# Patient Record
Sex: FEMALE | Race: WHITE | NOT HISPANIC OR LATINO | Employment: UNEMPLOYED | URBAN - NONMETROPOLITAN AREA
[De-identification: names, ages, dates, MRNs, and addresses within clinical notes are randomized per-mention and may not be internally consistent; named-entity substitution may affect disease eponyms.]

---

## 2019-08-13 ENCOUNTER — OFFICE VISIT (OUTPATIENT)
Dept: FAMILY MEDICINE CLINIC | Facility: CLINIC | Age: 29
End: 2019-08-13

## 2019-08-13 VITALS
HEART RATE: 62 BPM | WEIGHT: 146 LBS | DIASTOLIC BLOOD PRESSURE: 64 MMHG | BODY MASS INDEX: 23.46 KG/M2 | OXYGEN SATURATION: 99 % | SYSTOLIC BLOOD PRESSURE: 106 MMHG | HEIGHT: 66 IN

## 2019-08-13 DIAGNOSIS — F19.11 HISTORY OF DRUG ABUSE IN REMISSION (HCC): ICD-10-CM

## 2019-08-13 DIAGNOSIS — R06.02 SHORTNESS OF BREATH: ICD-10-CM

## 2019-08-13 DIAGNOSIS — R05.3 CHRONIC COUGH: Primary | ICD-10-CM

## 2019-08-13 DIAGNOSIS — B18.2 CHRONIC HEPATITIS C WITHOUT HEPATIC COMA (HCC): ICD-10-CM

## 2019-08-13 DIAGNOSIS — Z72.0 TOBACCO ABUSE: ICD-10-CM

## 2019-08-13 DIAGNOSIS — J30.9 ALLERGIC RHINITIS, UNSPECIFIED SEASONALITY, UNSPECIFIED TRIGGER: ICD-10-CM

## 2019-08-13 PROBLEM — J44.9 COPD (CHRONIC OBSTRUCTIVE PULMONARY DISEASE) (HCC): Status: ACTIVE | Noted: 2019-08-13

## 2019-08-13 PROCEDURE — 99406 BEHAV CHNG SMOKING 3-10 MIN: CPT | Performed by: NURSE PRACTITIONER

## 2019-08-13 PROCEDURE — 99214 OFFICE O/P EST MOD 30 MIN: CPT | Performed by: NURSE PRACTITIONER

## 2019-08-13 RX ORDER — NICOTINE 21 MG/24HR
1 PATCH, TRANSDERMAL 24 HOURS TRANSDERMAL EVERY 24 HOURS
Qty: 28 PATCH | Refills: 0 | Status: SHIPPED | OUTPATIENT
Start: 2019-08-13 | End: 2019-08-28

## 2019-08-13 RX ORDER — LORATADINE 10 MG/1
10 TABLET ORAL DAILY
Qty: 30 TABLET | Refills: 5 | Status: SHIPPED | OUTPATIENT
Start: 2019-08-13 | End: 2019-08-28 | Stop reason: SDUPTHER

## 2019-08-13 NOTE — PROGRESS NOTES
New Patient History and Physical      Referring Physician: No ref. provider found    Subjective     Chief Complaint:    Chief Complaint   Patient presents with   • Establish Care   • Cough     Patient states that she has had symptoms for approximately six months and treatment has not helped.    • Sinus Problem   • Ear Drainage   • Nicotine Dependence       History of Present Illness:   1 year of cough, chest congestion with mucous that is brown, clear or green. Feels like she has has nose, ear, and eye drainage. + Itchiness. Takes zyrtec prn. No nose spray. Feels soa when laying flat in bed, or when she anxious. No fever or chills. Over the past 6 months she has been put on amoxicillin, zpak and rocephin injection. Uses albuterol prn. Has hx of mild asthma when she was little. Has smoked 14 years ago. Has tried chantix, wellbutrin without success.      She does have history drug abuse.  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  She was on Suboxone.  No longer taking Suboxone.  Has history of hep C.  Was seen at the hep C clinic at .  Was not a treatment candidate due to insurance coverage.  Had her labs drawn a year ago with normal LFTs.  She has had hepatitis A vaccines.  She will check with [facility] on the status of her hepatitis B vaccines.    She is due for a Pap    Review of Systems   Constitutional: Negative for unexpected weight gain and unexpected weight loss.   Cardiovascular: Negative for chest pain and palpitations.   Gastrointestinal: Negative for abdominal pain, constipation, diarrhea and vomiting.   Endocrine: Negative for polydipsia and polyuria.   Genitourinary: Negative for dysuria and frequency.   Skin: Negative for rash.   Neurological: Negative for dizziness and light-headedness.   Hematological: Does not bruise/bleed easily.   Psychiatric/Behavioral: The patient is nervous/anxious.         Past Medical History:   Past Medical History:   Diagnosis Date   •  "Anxiety    • Depression    • GERD (gastroesophageal reflux disease)        Past Surgical History:  Past Surgical History:   Procedure Laterality Date   •  SECTION  , ,    • TONSILLECTOMY  age 13   • TUBAL ABDOMINAL LIGATION         Family History: family history includes Arthritis in her maternal grandfather, maternal grandmother, paternal grandfather, and paternal grandmother; Diabetes in her father; Heart attack in her mother; Mental illness in her mother; Thyroid disease in her mother.    Social History:  reports that she has been smoking cigarettes.  She has been smoking about 1.00 pack per day. She has never used smokeless tobacco. She reports that she does not drink alcohol or use drugs.    Medications:    Current Outpatient Medications:   •  loratadine (CLARITIN) 10 MG tablet, Take 1 tablet by mouth Daily., Disp: 30 tablet, Rfl: 5  •  nicotine (NICODERM CQ) 21 MG/24HR patch, Place 1 patch on the skin as directed by provider Daily., Disp: 28 patch, Rfl: 0    Allergies:  No Known Allergies    Objective     Vital Signs:   Vitals:    19 1502   BP: 106/64   Pulse: 62   SpO2: 99%   Weight: 66.2 kg (146 lb)   Height: 167.6 cm (66\")       Physical Exam:    Physical Exam   Constitutional: She is oriented to person, place, and time. She appears well-developed and well-nourished.   HENT:   Head: Normocephalic and atraumatic.   Right Ear: Tympanic membrane, external ear and ear canal normal.   Left Ear: Tympanic membrane, external ear and ear canal normal.   Nose: Mucosal edema and abnormal turbinate present.   Mouth/Throat: Uvula is midline, oropharynx is clear and moist and mucous membranes are normal.   Eyes: Conjunctivae, EOM and lids are normal. Pupils are equal, round, and reactive to light. No scleral icterus.   Fundoscopic exam:       The right eye shows red reflex.        The left eye shows red reflex.   Neck: Neck supple. Carotid bruit is not present. No tracheal deviation present. " No thyromegaly present.   Cardiovascular: Normal rate, regular rhythm, normal heart sounds and intact distal pulses. Exam reveals no gallop and no friction rub.   No murmur heard.  Pulmonary/Chest: Effort normal and breath sounds normal.   Abdominal: Soft. Bowel sounds are normal. She exhibits no distension. There is no hepatosplenomegaly. There is no tenderness.   Musculoskeletal: She exhibits no edema.   Lymphadenopathy:     She has no cervical adenopathy.   Neurological: She is alert and oriented to person, place, and time. No cranial nerve deficit or sensory deficit. She displays a negative Romberg sign.   Skin: Skin is warm and dry. No rash noted.   Psychiatric: She has a normal mood and affect. Her behavior is normal.   Nursing note and vitals reviewed.      Assessment / Plan     Assessment/Plan:   Problem List Items Addressed This Visit        Respiratory    Chronic cough - Primary    Relevant Orders    Full Pulmonary Function Test With Bronchodilator    Shortness of breath    Relevant Orders    Full Pulmonary Function Test With Bronchodilator    Allergic rhinitis    Relevant Medications    loratadine (CLARITIN) 10 MG tablet       Digestive    Chronic hepatitis C without hepatic coma (CMS/HCC)       Other    Tobacco abuse    Relevant Medications    nicotine (NICODERM CQ) 21 MG/24HR patch    History of drug abuse in remission          28-year-old with history of drug abuse, hepatitis C, tobacco abuse who presents to establish care    Chronic cough  Shortness of breath  -Discussed concern for COPD/asthma given symptoms  -PFTs ordered  -Continue albuterol  -Will treat further based on PFT results    Allergic rhinitis  -Daily loratadine    Hepatitis C  -Obtain records from [facility] for hepatitis vaccination status, needs routine liver enzyme checks    Tobacco abuse  -6 minutes was spent on tobacco cessation.  She will try nicotine patches.  May use nicotine gum/lozenge as needed with patch.  - Personal  "\"Quitting Coaches\" plus resources for free quitting aides and medications are available through the FREE Quit Now [place] program. Call 6-764-QUIT-NOW or visit www.quitnowkenRallyhood.org    Health maintenance  -Encouraged to schedule Pap smear  -Obtain vaccine records from [facility]    Follow up:  Return in about 3 weeks (around 9/3/2019).      Electronically signed by KOFI Bejarano   08/13/2019 3:09 PM      Please note that portions of this note may have been completed with a voice recognition program. Efforts were made to edit the dictations, but occasionally words are mistranscribed.  "

## 2019-08-23 ENCOUNTER — HOSPITAL ENCOUNTER (OUTPATIENT)
Dept: PULMONOLOGY | Facility: HOSPITAL | Age: 29
Discharge: HOME | End: 2019-08-23
Admitting: NURSE PRACTITIONER

## 2019-08-23 DIAGNOSIS — R05.3 CHRONIC COUGH: ICD-10-CM

## 2019-08-23 DIAGNOSIS — R06.02 SHORTNESS OF BREATH: ICD-10-CM

## 2019-08-23 PROCEDURE — 94729 DIFFUSING CAPACITY: CPT | Performed by: INTERNAL MEDICINE

## 2019-08-23 PROCEDURE — 94060 EVALUATION OF WHEEZING: CPT

## 2019-08-23 PROCEDURE — 94640 AIRWAY INHALATION TREATMENT: CPT

## 2019-08-23 PROCEDURE — 94060 EVALUATION OF WHEEZING: CPT | Performed by: INTERNAL MEDICINE

## 2019-08-23 PROCEDURE — 94726 PLETHYSMOGRAPHY LUNG VOLUMES: CPT | Performed by: INTERNAL MEDICINE

## 2019-08-23 PROCEDURE — 94726 PLETHYSMOGRAPHY LUNG VOLUMES: CPT

## 2019-08-23 PROCEDURE — 94729 DIFFUSING CAPACITY: CPT

## 2019-08-23 RX ORDER — ALBUTEROL SULFATE 2.5 MG/3ML
2.5 SOLUTION RESPIRATORY (INHALATION) ONCE
Status: COMPLETED | OUTPATIENT
Start: 2019-08-23 | End: 2019-08-23

## 2019-08-23 RX ADMIN — ALBUTEROL SULFATE 2.5 MG: 2.5 SOLUTION RESPIRATORY (INHALATION) at 09:03

## 2019-08-28 ENCOUNTER — OFFICE VISIT (OUTPATIENT)
Dept: FAMILY MEDICINE CLINIC | Facility: CLINIC | Age: 29
End: 2019-08-28

## 2019-08-28 VITALS
OXYGEN SATURATION: 99 % | HEIGHT: 66 IN | SYSTOLIC BLOOD PRESSURE: 100 MMHG | BODY MASS INDEX: 23.3 KG/M2 | DIASTOLIC BLOOD PRESSURE: 60 MMHG | HEART RATE: 74 BPM | TEMPERATURE: 98.6 F | RESPIRATION RATE: 14 BRPM | WEIGHT: 145 LBS

## 2019-08-28 DIAGNOSIS — F41.9 ANXIETY AND DEPRESSION: ICD-10-CM

## 2019-08-28 DIAGNOSIS — F32.A ANXIETY AND DEPRESSION: ICD-10-CM

## 2019-08-28 DIAGNOSIS — J30.9 ALLERGIC RHINITIS, UNSPECIFIED SEASONALITY, UNSPECIFIED TRIGGER: Primary | ICD-10-CM

## 2019-08-28 PROCEDURE — 99214 OFFICE O/P EST MOD 30 MIN: CPT | Performed by: NURSE PRACTITIONER

## 2019-08-28 RX ORDER — MONTELUKAST SODIUM 10 MG/1
10 TABLET ORAL NIGHTLY
Qty: 30 TABLET | Refills: 5 | Status: SHIPPED | OUTPATIENT
Start: 2019-08-28

## 2019-08-28 RX ORDER — CETIRIZINE HYDROCHLORIDE 10 MG/1
10 TABLET ORAL DAILY
COMMUNITY

## 2019-08-28 RX ORDER — FLUOXETINE 10 MG/1
CAPSULE ORAL
Qty: 50 CAPSULE | Refills: 0 | Status: SHIPPED | OUTPATIENT
Start: 2019-08-28 | End: 2019-10-11 | Stop reason: SDUPTHER

## 2019-08-28 RX ORDER — IBUPROFEN 400 MG/1
TABLET ORAL
COMMUNITY
Start: 2019-08-14

## 2019-08-28 NOTE — PROGRESS NOTES
Subjective     Chief Complaint:    Chief Complaint   Patient presents with   • Follow-up     3 week follow up on cough and PFT, started taking Zyrtec along with Claritin and cough seems to be a little better       History of Present Illness:   Did not tolerate nicotine patches. Made her burn up at night. Has started taking zyrtec with her claritin. She has cats and dogs at home and things that makes her symptoms worse. Worse when she lays down at night. Uses ventolin once a day. Feels like her anxiety is making her symptoms worse. She is having daily panic attacks. Worse in social situations. Has taken paxil in the past. Had to stop due to transporation. Has depression. Is starting a new job soon and feels like she needs some medication. No thoughts of grandiosity. No SI/HI. Has been fired from several jobs due to panic attacks.     Review of Systems   Constitutional: Negative for chills and fever.   Respiratory: Positive for cough and shortness of breath.    Cardiovascular: Negative for chest pain and palpitations.   Gastrointestinal: Negative for abdominal pain and nausea.   Neurological: Negative for dizziness and headache.   Psychiatric/Behavioral: Positive for depressed mood. The patient is nervous/anxious.         I have reviewed and/or updated the patient's past medical, surgical, family, social history and problem list as appropriate.   Medications:    Current Outpatient Medications:   •  cetirizine (zyrTEC) 10 MG tablet, Take 10 mg by mouth Daily., Disp: , Rfl:   •  ibuprofen (ADVIL,MOTRIN) 400 MG tablet, , Disp: , Rfl:   •  FLUoxetine (PROZAC) 10 MG capsule, Take 10mg in the morning for 1-2 weeks, then take 20mg once daily thereafter, Disp: 50 capsule, Rfl: 0  •  montelukast (SINGULAIR) 10 MG tablet, Take 1 tablet by mouth Every Night., Disp: 30 tablet, Rfl: 5    Allergies:  No Known Allergies    Objective     Vital Signs:   Vitals:    08/28/19 0802   BP: 100/60   Pulse: 74   Resp: 14   Temp: 98.6 °F  "(37 °C)   SpO2: 99%   Weight: 65.8 kg (145 lb)   Height: 167.6 cm (66\")       Physical Exam:    Physical Exam   Constitutional: She is oriented to person, place, and time. She appears well-developed and well-nourished.   HENT:   Head: Normocephalic and atraumatic.   Cardiovascular: Normal rate and regular rhythm.   Pulmonary/Chest: Effort normal and breath sounds normal.   Abdominal: Soft. Bowel sounds are normal. She exhibits no distension.   Neurological: She is alert and oriented to person, place, and time.   Skin: Skin is warm and dry.   Psychiatric:   Tearful    Nursing note and vitals reviewed.      Assessment / Plan     Assessment/Plan:   Problem List Items Addressed This Visit        Respiratory    Allergic rhinitis - Primary    Relevant Medications    montelukast (SINGULAIR) 10 MG tablet       Other    Anxiety and depression    Relevant Medications    FLUoxetine (PROZAC) 10 MG capsule          Allergic Rhinitis  SOA  Tobacco abuse  - stop claritin, continue zyrtec, start singulair  - PFT reviewed and relatively normal  - I think anxiety is driving a lot of her SOA  - failed nicotine patches, encouraged cessation    Anxiety and Depression  - prozac,  Medication discussed, advised to go to ED for any SI/HI, encouraged counseling/CBT, encouraged clean eating and exercise      Follow up:  Return in about 1 month (around 9/28/2019) for pap, depression.    Electronically signed by KOFI Bejarano   08/28/2019 8:15 AM      Please note that portions of this note may have been completed with a voice recognition program. Efforts were made to edit the dictations, but occasionally words are mistranscribed.  "

## 2019-10-11 ENCOUNTER — OFFICE VISIT (OUTPATIENT)
Dept: FAMILY MEDICINE CLINIC | Facility: CLINIC | Age: 29
End: 2019-10-11

## 2019-10-11 VITALS
TEMPERATURE: 98.4 F | HEIGHT: 66 IN | OXYGEN SATURATION: 98 % | BODY MASS INDEX: 21.74 KG/M2 | SYSTOLIC BLOOD PRESSURE: 115 MMHG | DIASTOLIC BLOOD PRESSURE: 70 MMHG | WEIGHT: 135.25 LBS | HEART RATE: 66 BPM

## 2019-10-11 DIAGNOSIS — N92.6 IRREGULAR MENSES: ICD-10-CM

## 2019-10-11 DIAGNOSIS — K21.9 GASTROESOPHAGEAL REFLUX DISEASE WITHOUT ESOPHAGITIS: ICD-10-CM

## 2019-10-11 DIAGNOSIS — F41.9 ANXIETY AND DEPRESSION: ICD-10-CM

## 2019-10-11 DIAGNOSIS — Z01.419 ENCOUNTER FOR WELL WOMAN EXAM WITH ROUTINE GYNECOLOGICAL EXAM: Primary | ICD-10-CM

## 2019-10-11 DIAGNOSIS — F32.A ANXIETY AND DEPRESSION: ICD-10-CM

## 2019-10-11 DIAGNOSIS — N63.0 BREAST NODULE: ICD-10-CM

## 2019-10-11 PROCEDURE — 99395 PREV VISIT EST AGE 18-39: CPT | Performed by: NURSE PRACTITIONER

## 2019-10-11 RX ORDER — BUPRENORPHINE HYDROCHLORIDE AND NALOXONE HYDROCHLORIDE DIHYDRATE 8; 2 MG/1; MG/1
TABLET SUBLINGUAL
COMMUNITY
Start: 2019-10-07

## 2019-10-11 RX ORDER — FLUOXETINE HYDROCHLORIDE 20 MG/1
CAPSULE ORAL
Qty: 30 CAPSULE | Refills: 5 | Status: SHIPPED | OUTPATIENT
Start: 2019-10-11 | End: 2020-01-10

## 2019-10-11 RX ORDER — OMEPRAZOLE 40 MG/1
40 CAPSULE, DELAYED RELEASE ORAL DAILY
Qty: 30 CAPSULE | Refills: 5 | Status: SHIPPED | OUTPATIENT
Start: 2019-10-11 | End: 2020-01-10

## 2019-10-11 RX ORDER — AMOXICILLIN AND CLAVULANATE POTASSIUM 875; 125 MG/1; MG/1
TABLET, FILM COATED ORAL
COMMUNITY
Start: 2019-10-08 | End: 2019-10-11

## 2019-10-11 RX ORDER — OMEPRAZOLE 40 MG/1
CAPSULE, DELAYED RELEASE ORAL
COMMUNITY
Start: 2019-09-23 | End: 2019-10-11 | Stop reason: SDUPTHER

## 2019-10-11 NOTE — PROGRESS NOTES
Annual Well Woman Exam     Referring Physician: No ref. provider found    Subjective     Chief Complaint:    Chief Complaint   Patient presents with   • Gynecologic Exam     Annual physical with PAP   • Med Refill     Needs refill Prozac and would like to discuss getting omeprazole.       History of Present Illness:    Iram Grace is a 29 y.o. female who presents for annual exam, follow-up on depression and anxiety and reflux.  Reports doing well on the Prozac over the past couple weeks.  Still having some anxiety but not as much.  Panic attacks have decreased.  Overall mood has improved.  She does note about over a month ago she did almost relapse.  Due to this she went and is started Suboxone.  She is planning to take Suboxone for 6 months.  She reports almost daily reflux up into her chest regardless of what she eats.  Suboxone doctor put her on Prilosec which helped well.  She would like to have a refill.  Still smoking about half a pack a day.  Breathing is better.    Here for Pap.  Positive family history for breast cancer.  Does note left breast lump.  Not painful.  Increases in size when she misses a period.  Reports drinking lots of caffeine.  Skipped her period 2 months ago then normal.  This month.  Has done this off and on for a few years.  Has her tubes tied.     Review of Systems   Constitutional: Negative for fatigue and fever.   HENT: Negative for congestion and postnasal drip.    Respiratory: Negative for cough and shortness of breath.    Cardiovascular: Negative for chest pain and palpitations.   Genitourinary: Negative for vaginal discharge and vaginal pain.   Psychiatric/Behavioral: Negative for sleep disturbance. The patient is nervous/anxious.        Obstetric History:  OB History     No data available         Menstrual History:     No LMP recorded.       Sexual History:         Current contraception: tubal ligation  History of abnormal Pap smear: no    Perform regular self  "breast exam: yes - monthly  Family history of uterine or ovarian cancer: no  Family History of cervical cancer: no  Family History of colon cancer/colon polyps: no  History of abnormal mammogram: no  Family history of breast cancer: yes - grandmother  History of abnormal lipids: no    The following portions of the patient's history were reviewed and updated as appropriate: allergies, current medications, past family history, past medical history, past social history, past surgical history and problem list.    Objective     Vital Signs:   Vitals:    10/11/19 1009   BP: 115/70   Pulse: 66   Temp: 98.4 °F (36.9 °C)   SpO2: 98%   Weight: 61.3 kg (135 lb 4 oz)   Height: 167.6 cm (65.98\")       Physical Exam:    Physical Exam   Constitutional: She is oriented to person, place, and time. She appears well-developed and well-nourished.   HENT:   Head: Normocephalic and atraumatic.   Eyes: Pupils are equal, round, and reactive to light.   Neck: Neck supple.   Cardiovascular: Normal rate and regular rhythm.   Pulmonary/Chest: Effort normal and breath sounds normal.   Mobile firm marble size lump noted in left breast at 3 o'clock position.        Abdominal: Soft. Bowel sounds are normal. She exhibits no distension.   Genitourinary: Vagina normal and uterus normal. There is no rash or lesion on the right labia. There is no rash or lesion on the left labia. Uterus is retroverted. Cervix exhibits friability and cyanosis. Right adnexum displays no mass and no tenderness. Left adnexum displays no mass and no tenderness.   Neurological: She is alert and oriented to person, place, and time.   Skin: Skin is warm and dry.   Psychiatric: She has a normal mood and affect. Her behavior is normal.   Nursing note and vitals reviewed.      Assessment / Plan     Assessment/Plan:   Problem List Items Addressed This Visit        Digestive    Gastroesophageal reflux disease without esophagitis    Relevant Medications    omeprazole (priLOSEC) 40 " MG capsule       Other    Anxiety and depression    Relevant Medications    FLUoxetine (PROzac) 20 MG capsule      Other Visit Diagnoses     Encounter for well woman exam with routine gynecological exam    -  Primary    Relevant Orders    Liquid-based Pap Smear, Screening    Irregular menses        Relevant Orders    Comprehensive Metabolic Panel    CBC Auto Differential    TSH    Breast nodule        Relevant Orders    US breast left complete        --Pap performed  --Continue Prozac. - Medication discussed, advised to go to ED for any SI/HI, encouraged counseling/CBT, encouraged clean eating and exercise  --Refilled Prilosec. - Avoid eating spicy foods, Avoid caffeinated beverages, Avoid carbonated beverage, Do not eat or drink within 2-3 hours of going to bed in the evening, Elevate head of bed on 4-6 inch blocks, Eat smaller portions of food throughout the day  --Check labs due to irregular menses  -- discussed breast nodule is likely fibrocystic change, however due to family hx will obtain ultrasound. Encourage to decrease caffeine intake    Discussion Summary:  Labs as noted  Recommend avoidance of processed foods  Exercise for 150 minutes per week as tolerated  Recommend annual eye doctor appointment, or as necessary  See your dentist twice a year.  Recommend that you brush teeth twice daily and floss minimum of once daily  Recommend regular seatbelt use  Will contact with Pap results when available  Recommend monthly self-breast exam  I have reviewed pertinent health maintenance applicable to this patient.    Follow up:  In 3 months for depression and anxiety    Electronically signed by KOFI Bejarano   10/11/2019 10:58 AM

## 2019-10-12 LAB
ALBUMIN SERPL-MCNC: 4.6 G/DL (ref 3.5–5.2)
ALBUMIN/GLOB SERPL: 2 G/DL
ALP SERPL-CCNC: 40 U/L (ref 39–117)
ALT SERPL-CCNC: 29 U/L (ref 1–33)
AST SERPL-CCNC: 21 U/L (ref 1–32)
BASOPHILS # BLD AUTO: 0.02 10*3/MM3 (ref 0–0.2)
BASOPHILS NFR BLD AUTO: 0.3 % (ref 0–1.5)
BILIRUB SERPL-MCNC: 0.3 MG/DL (ref 0.2–1.2)
BUN SERPL-MCNC: 10 MG/DL (ref 6–20)
BUN/CREAT SERPL: 11.2 (ref 7–25)
CALCIUM SERPL-MCNC: 9.7 MG/DL (ref 8.6–10.5)
CHLORIDE SERPL-SCNC: 104 MMOL/L (ref 98–107)
CO2 SERPL-SCNC: 28.5 MMOL/L (ref 22–29)
CREAT SERPL-MCNC: 0.89 MG/DL (ref 0.57–1)
EOSINOPHIL # BLD AUTO: 0.12 10*3/MM3 (ref 0–0.4)
EOSINOPHIL NFR BLD AUTO: 2 % (ref 0.3–6.2)
ERYTHROCYTE [DISTWIDTH] IN BLOOD BY AUTOMATED COUNT: 11.7 % (ref 12.3–15.4)
GLOBULIN SER CALC-MCNC: 2.3 GM/DL
GLUCOSE SERPL-MCNC: 85 MG/DL (ref 65–99)
HCT VFR BLD AUTO: 40.8 % (ref 34–46.6)
HGB BLD-MCNC: 13.9 G/DL (ref 12–15.9)
IMM GRANULOCYTES # BLD AUTO: 0.01 10*3/MM3 (ref 0–0.05)
IMM GRANULOCYTES NFR BLD AUTO: 0.2 % (ref 0–0.5)
LYMPHOCYTES # BLD AUTO: 2 10*3/MM3 (ref 0.7–3.1)
LYMPHOCYTES NFR BLD AUTO: 33.2 % (ref 19.6–45.3)
MCH RBC QN AUTO: 31.4 PG (ref 26.6–33)
MCHC RBC AUTO-ENTMCNC: 34.1 G/DL (ref 31.5–35.7)
MCV RBC AUTO: 92.1 FL (ref 79–97)
MONOCYTES # BLD AUTO: 0.35 10*3/MM3 (ref 0.1–0.9)
MONOCYTES NFR BLD AUTO: 5.8 % (ref 5–12)
NEUTROPHILS # BLD AUTO: 3.53 10*3/MM3 (ref 1.7–7)
NEUTROPHILS NFR BLD AUTO: 58.5 % (ref 42.7–76)
NRBC BLD AUTO-RTO: 0.2 /100 WBC (ref 0–0.2)
PLATELET # BLD AUTO: 242 10*3/MM3 (ref 140–450)
POTASSIUM SERPL-SCNC: 5.1 MMOL/L (ref 3.5–5.2)
PROT SERPL-MCNC: 6.9 G/DL (ref 6–8.5)
RBC # BLD AUTO: 4.43 10*6/MM3 (ref 3.77–5.28)
SODIUM SERPL-SCNC: 143 MMOL/L (ref 136–145)
TSH SERPL DL<=0.005 MIU/L-ACNC: 2.6 UIU/ML (ref 0.27–4.2)
WBC # BLD AUTO: 6.03 10*3/MM3 (ref 3.4–10.8)

## 2019-10-17 ENCOUNTER — APPOINTMENT (OUTPATIENT)
Dept: ULTRASOUND IMAGING | Facility: HOSPITAL | Age: 29
End: 2019-10-17

## 2019-10-18 ENCOUNTER — HOSPITAL ENCOUNTER (OUTPATIENT)
Dept: ULTRASOUND IMAGING | Facility: HOSPITAL | Age: 29
End: 2019-10-18

## 2019-11-12 ENCOUNTER — TELEPHONE (OUTPATIENT)
Dept: PEDIATRICS | Facility: OTHER | Age: 29
End: 2019-11-12

## 2019-11-12 NOTE — TELEPHONE ENCOUNTER
Spoke to patient, informed that her bill is related to PAP smear, that Chlamydia was negative, and provided billing numbers if needed. Patient voiced understanding.

## 2019-11-12 NOTE — TELEPHONE ENCOUNTER
Pt called carlotta out because she received a billing statement from lab work back in Oct stating she was being billed for clamydia. Pt has not been diagnosed w/ clamydia and doesn't understand why she is being billed for it. Pt would like to know if her lab work done back in Oct showed she had clamydia and if so why she was not informed? Pt would like someone to call her back today to discuss this with her.

## 2020-01-07 ENCOUNTER — TELEPHONE (OUTPATIENT)
Dept: FAMILY MEDICINE CLINIC | Facility: CLINIC | Age: 30
End: 2020-01-07

## 2020-01-07 NOTE — TELEPHONE ENCOUNTER
Patient called to request a pain Rx substitute that doesn't have tylenol in it since she can't take tylenol. She requested that she be prescribed Gabapentin to help with her pain. She just had a tooth pulled and doesn't have any pain prescriptions to take currently.      Patient Callback # [phone]

## 2020-01-07 NOTE — TELEPHONE ENCOUNTER
Please advise.    Patient has not been seen since October, but does have an appointment on 1/10/2020

## 2020-01-08 NOTE — TELEPHONE ENCOUNTER
Patient has been notified.    Patient states that she is actually feeling pretty good and is fine with the fact that gabapentin can not be called in.

## 2020-01-08 NOTE — TELEPHONE ENCOUNTER
Her liver enzymes are normal. She can take tylenol. I do not recommend gabapentin for a number of reasons that we can discuss at her visit if she wishes.

## 2020-01-10 ENCOUNTER — OFFICE VISIT (OUTPATIENT)
Dept: FAMILY MEDICINE CLINIC | Facility: CLINIC | Age: 30
End: 2020-01-10

## 2020-01-10 VITALS
DIASTOLIC BLOOD PRESSURE: 60 MMHG | HEIGHT: 66 IN | HEART RATE: 73 BPM | TEMPERATURE: 98.7 F | BODY MASS INDEX: 22.88 KG/M2 | SYSTOLIC BLOOD PRESSURE: 100 MMHG | WEIGHT: 142.38 LBS | OXYGEN SATURATION: 97 %

## 2020-01-10 DIAGNOSIS — K21.9 GASTROESOPHAGEAL REFLUX DISEASE WITHOUT ESOPHAGITIS: Primary | ICD-10-CM

## 2020-01-10 DIAGNOSIS — F33.0 MILD EPISODE OF RECURRENT MAJOR DEPRESSIVE DISORDER (HCC): ICD-10-CM

## 2020-01-10 PROCEDURE — 99214 OFFICE O/P EST MOD 30 MIN: CPT | Performed by: NURSE PRACTITIONER

## 2020-01-10 RX ORDER — BUSPIRONE HYDROCHLORIDE 5 MG/1
TABLET ORAL
Qty: 90 TABLET | Refills: 0 | Status: SHIPPED | OUTPATIENT
Start: 2020-01-10 | End: 2020-03-02 | Stop reason: SDUPTHER

## 2020-01-10 RX ORDER — PANTOPRAZOLE SODIUM 40 MG/1
40 TABLET, DELAYED RELEASE ORAL DAILY
Qty: 30 TABLET | Refills: 5 | Status: SHIPPED | OUTPATIENT
Start: 2020-01-10

## 2020-01-10 RX ORDER — FLUOXETINE HYDROCHLORIDE 40 MG/1
40 CAPSULE ORAL DAILY
Qty: 30 CAPSULE | Refills: 5 | Status: SHIPPED | OUTPATIENT
Start: 2020-01-10 | End: 2020-01-10

## 2020-01-10 RX ORDER — PANTOPRAZOLE SODIUM 40 MG/1
40 TABLET, DELAYED RELEASE ORAL DAILY
Qty: 30 TABLET | Refills: 5 | Status: SHIPPED | OUTPATIENT
Start: 2020-01-10 | End: 2020-01-10

## 2020-01-10 RX ORDER — BUSPIRONE HYDROCHLORIDE 5 MG/1
TABLET ORAL
Qty: 90 TABLET | Refills: 0 | Status: SHIPPED | OUTPATIENT
Start: 2020-01-10 | End: 2020-01-10

## 2020-01-10 RX ORDER — FLUOXETINE HYDROCHLORIDE 40 MG/1
40 CAPSULE ORAL DAILY
Qty: 30 CAPSULE | Refills: 5 | Status: SHIPPED | OUTPATIENT
Start: 2020-01-10 | End: 2020-06-26 | Stop reason: SDUPTHER

## 2020-01-10 NOTE — PROGRESS NOTES
Subjective     Chief Complaint:    Chief Complaint   Patient presents with   • Heartburn     Patient states that Omeprazole is not working and would like a prescription for Protonix. States that her mother takes this and she took one of her mothers pills and it helps symptoms.    • Anxiety     Patient would like to increase Prozac. Patient also states that she constantly feels nervous and is shaking and would like something for her nerves.    • Depression       History of Present Illness:   Would like to go up on prozac. She is taking 20mg of prozac. She is still having excessive worries. Anxiety in social situations. + panic at times. Notes her depressive symptoms are very controlled. Would like something for her nerves. Feels shaky at times. Is working full time. Is taking suboxone and should be done with treatment this spring. Goes to counseling once weekly.      Prilosec is not helping GERD. Still having reflux symptoms frequently. Would like to try protonix. Has modified her diet.     Review of Systems   Constitutional: Negative for chills, fatigue and fever.   Respiratory: Negative for cough and shortness of breath.    Cardiovascular: Negative for chest pain and palpitations.   Gastrointestinal: Negative for abdominal pain and nausea.   Neurological: Negative for dizziness and headache.        I have reviewed and/or updated the patient's past medical, surgical, family, social history and problem list as appropriate.     Medications:    Current Outpatient Medications:   •  buprenorphine-naloxone (SUBOXONE) 8-2 MG per SL tablet, , Disp: , Rfl:   •  cetirizine (zyrTEC) 10 MG tablet, Take 10 mg by mouth Daily., Disp: , Rfl:   •  ibuprofen (ADVIL,MOTRIN) 400 MG tablet, , Disp: , Rfl:   •  montelukast (SINGULAIR) 10 MG tablet, Take 1 tablet by mouth Every Night., Disp: 30 tablet, Rfl: 5  •  busPIRone (BUSPAR) 5 MG tablet, Take 1 BID, may increase to 2 BID if needed, Disp: 90 tablet, Rfl: 0  •  FLUoxetine  "(PROZAC) 40 MG capsule, Take 1 capsule by mouth Daily., Disp: 30 capsule, Rfl: 5  •  pantoprazole (PROTONIX) 40 MG EC tablet, Take 1 tablet by mouth Daily., Disp: 30 tablet, Rfl: 5    Allergies:  No Known Allergies    Objective     Vital Signs:   Vitals:    01/10/20 0951   BP: 100/60   Pulse: 73   Temp: 98.7 °F (37.1 °C)   TempSrc: Temporal   SpO2: 97%   Weight: 64.6 kg (142 lb 6 oz)   Height: 167.6 cm (66\")       Physical Exam:    Physical Exam   Constitutional: She is oriented to person, place, and time. She appears well-developed and well-nourished.   HENT:   Head: Normocephalic and atraumatic.   Eyes: Pupils are equal, round, and reactive to light.   Neck: Neck supple.   Cardiovascular: Normal rate, regular rhythm, normal heart sounds and intact distal pulses.   Pulmonary/Chest: Effort normal and breath sounds normal.   Abdominal: Soft. Bowel sounds are normal. She exhibits no distension.   Musculoskeletal: She exhibits no edema.   Neurological: She is alert and oriented to person, place, and time.   Skin: Skin is warm and dry.   Psychiatric: She has a normal mood and affect. Her behavior is normal.   Nursing note and vitals reviewed.      Assessment / Plan     Assessment/Plan:   Problem List Items Addressed This Visit        Digestive    Gastroesophageal reflux disease without esophagitis - Primary    Relevant Medications    pantoprazole (PROTONIX) 40 MG EC tablet       Other    Mild episode of recurrent major depressive disorder (CMS/HCC)    Relevant Medications    busPIRone (BUSPAR) 5 MG tablet    FLUoxetine (PROZAC) 40 MG capsule        -- increase prozac, add buspar, discussed up titration of buspar if needed. Continue counseling. Medication discussed, advised to go to ED for any SI/HI, encouraged clean eating and exercise  -- stop prilosec. Start protonix. Take in AM. Avoid eating spicy foods, Avoid caffeinated beverages, Avoid carbonated beverage, Do not eat or drink within 2-3 hours of going to bed in " the evening, Elevate head of bed on 4-6 inch blocks, Eat smaller portions of food throughout the day    Follow up:  Return in about 1 month (around 2/10/2020). follow up anxiety    Electronically signed by KOFI Bejarano   01/10/2020 10:01 AM      Please note that portions of this note may have been completed with a voice recognition program. Efforts were made to edit the dictations, but occasionally words are mistranscribed.

## 2020-01-11 PROBLEM — F41.1 GAD (GENERALIZED ANXIETY DISORDER): Status: ACTIVE | Noted: 2020-01-11

## 2020-01-11 PROBLEM — F32.A MODERATE DEPRESSIVE DISORDER: Status: ACTIVE | Noted: 2020-01-11

## 2020-01-11 PROBLEM — F33.0 MILD EPISODE OF RECURRENT MAJOR DEPRESSIVE DISORDER (HCC): Status: ACTIVE | Noted: 2020-01-11

## 2020-01-13 ENCOUNTER — PRIOR AUTHORIZATION (OUTPATIENT)
Dept: FAMILY MEDICINE CLINIC | Facility: CLINIC | Age: 30
End: 2020-01-13

## 2020-01-13 NOTE — TELEPHONE ENCOUNTER
A prior auth has been started through cover my meds for pantoprazole.    Patient's medication has been approved.    Patients pharmacy has been notified.

## 2020-03-02 DIAGNOSIS — F33.0 MILD EPISODE OF RECURRENT MAJOR DEPRESSIVE DISORDER (HCC): ICD-10-CM

## 2020-03-02 RX ORDER — BUSPIRONE HYDROCHLORIDE 5 MG/1
TABLET ORAL
Qty: 90 TABLET | Refills: 0 | Status: SHIPPED | OUTPATIENT
Start: 2020-03-02 | End: 2020-04-22 | Stop reason: SDUPTHER

## 2020-03-02 NOTE — TELEPHONE ENCOUNTER
Patient requesting refill on   busPIRone (BUSPAR) 5 MG tablet    Send to: [facility] ([place]) - [place], [place] - [address],

## 2020-04-22 DIAGNOSIS — F33.0 MILD EPISODE OF RECURRENT MAJOR DEPRESSIVE DISORDER (HCC): ICD-10-CM

## 2020-04-22 RX ORDER — BUSPIRONE HYDROCHLORIDE 5 MG/1
TABLET ORAL
Qty: 90 TABLET | Refills: 0 | Status: SHIPPED | OUTPATIENT
Start: 2020-04-22 | End: 2020-06-03 | Stop reason: SDUPTHER

## 2020-04-22 NOTE — TELEPHONE ENCOUNTER
Patient called and stated that refills are needed for the following prescription(s):    busPIRone (BUSPAR) 5 MG tablet    Pharmacy: [facility] [place]  PH: [phone]  FX: [phone]    Patient callback: [phone]    Please advise.

## 2020-06-03 DIAGNOSIS — F33.0 MILD EPISODE OF RECURRENT MAJOR DEPRESSIVE DISORDER (HCC): ICD-10-CM

## 2020-06-03 RX ORDER — BUSPIRONE HYDROCHLORIDE 5 MG/1
TABLET ORAL
Qty: 90 TABLET | Refills: 0 | Status: SHIPPED | OUTPATIENT
Start: 2020-06-03

## 2020-06-26 DIAGNOSIS — F33.0 MILD EPISODE OF RECURRENT MAJOR DEPRESSIVE DISORDER (HCC): ICD-10-CM

## 2020-06-26 RX ORDER — FLUOXETINE HYDROCHLORIDE 40 MG/1
40 CAPSULE ORAL DAILY
Qty: 30 CAPSULE | Refills: 1 | Status: SHIPPED | OUTPATIENT
Start: 2020-06-26

## 2020-06-26 RX ORDER — FLUOXETINE HYDROCHLORIDE 40 MG/1
40 CAPSULE ORAL DAILY
Qty: 30 CAPSULE | Refills: 1 | Status: SHIPPED | OUTPATIENT
Start: 2020-06-26 | End: 2020-06-26 | Stop reason: SDUPTHER

## 2020-06-26 NOTE — TELEPHONE ENCOUNTER
Medication refill for fluoxetine 40 mg.    Patients medication has been sent. Patient needs follow up appt for further refills.